# Patient Record
Sex: MALE | Race: WHITE | NOT HISPANIC OR LATINO | Employment: UNEMPLOYED | URBAN - METROPOLITAN AREA
[De-identification: names, ages, dates, MRNs, and addresses within clinical notes are randomized per-mention and may not be internally consistent; named-entity substitution may affect disease eponyms.]

---

## 2019-12-26 ENCOUNTER — EVALUATION (OUTPATIENT)
Dept: PHYSICAL THERAPY | Facility: CLINIC | Age: 49
End: 2019-12-26
Payer: COMMERCIAL

## 2019-12-26 DIAGNOSIS — S83.242A OTHER TEAR OF MEDIAL MENISCUS, CURRENT INJURY, LEFT KNEE, INITIAL ENCOUNTER: Primary | ICD-10-CM

## 2019-12-26 PROCEDURE — 97110 THERAPEUTIC EXERCISES: CPT

## 2019-12-26 PROCEDURE — 97140 MANUAL THERAPY 1/> REGIONS: CPT

## 2019-12-26 PROCEDURE — 97162 PT EVAL MOD COMPLEX 30 MIN: CPT

## 2019-12-26 RX ORDER — LISINOPRIL 20 MG/1
20 TABLET ORAL 2 TIMES DAILY
COMMUNITY

## 2019-12-26 NOTE — LETTER
2019    Rudi Ca MD  [facility] [address] [place] [address]    Patient: Meghan Bryant   YOB: 1970   Date of Visit: 2019     Encounter Diagnosis     ICD-10-CM    1  Other tear of medial meniscus, current injury, left knee, initial encounter [id number]        Dear Dr Maddie Owusu: Thank you for your recent referral of Meghan Bryant  Please review the attached evaluation summary from [facility] recent visit  Please verify that you agree with the plan of care by signing the attached order  If you have any questions or concerns, please do not hesitate to call  I sincerely appreciate the opportunity to share in the care of one of your patients and hope to have another opportunity to work with you in the near future  Sincerely,    Emmanuel Durant, PT      Referring Provider:      I certify that I have read the below Plan of Care and certify the need for these services furnished under this plan of treatment while under my care  Rudi Ca MD  [facility] 31: [phone]          PT Evaluation     Today's date: 2019  Patient name: Meghan Bryant  : 1970  MRN: [id number]  Referring provider: Td Reyes MD  Dx:   Encounter Diagnosis     ICD-10-CM    1  Other tear of medial meniscus, current injury, left knee, initial encounter S83 242A        Start Time: 930  Stop Time: 1030  Total time in clinic (min): 60 minutes    Assessment  Assessment details: Patient is a 52 yo male presenting to physical therapy with symptoms consistent with L meniscal tear that started about one month ago after stepping and twisting  Patient is TTP along the medial joint line, has increased pain with weight bearing and had swelling after initial injury   Patient presents with decreased hip and knee strength 2* pain levels, decreased knee flexion ROM, decreased patellar mobility and gait deviations such as lateral lean and antalgic  Patient has increased pain with walking, standing, lifting, climbing a ladder and driving  The patient's pain is impeding on his daily activities and job related tasks  Patient is receiving a MRI on 12/27/19 on the L knee  PT will address the noted impairments by performing hip and knee strengthening, stretching, balance, functional activities and manual techniques to allow the patient to return to his PLOF  PT recommended 2x/week for 4 weeks c a guarded prognosis 2* pain levels with movement       Impairments: abnormal gait, abnormal or restricted ROM, activity intolerance, impaired balance, impaired physical strength, lacks appropriate home exercise program, pain with function, safety issue and weight-bearing intolerance    Symptom irritability: moderateUnderstanding of Dx/Px/POC: good   Prognosis: fair    Goals  STG: In four weeks the patient will:    1  Be (I) with his HEP  2  Increase hip and knee strength to 4+/5 MMT score to assist c ADLs  3  Increase knee ROM to 135 degrees to assist c stairs and transfers  LTG: In six weeks, the patient will:    1  Increase FOTO score to 57 to demonstrate improvements in symptoms and function  2  Demonstrate full L knee AROM without pain  3  Perform 30 mins of walking without pain  4  Increase hip and knee strength to 5/5 MMT score to assist c prolonged walking and standing  5  Descend a flight of stairs without pain          Plan  Patient would benefit from: skilled physical therapy  Planned modality interventions: cryotherapy and thermotherapy: hydrocollator packs  Planned therapy interventions: abdominal trunk stabilization, joint mobilization, manual therapy, massage, Hancock taping, balance, body mechanics training, neuromuscular re-education, patient education, postural training, strengthening, stretching, therapeutic activities, therapeutic exercise and home exercise program  Frequency: 2x week  Duration in visits: 8  Duration in weeks: 4  Plan of Care beginning date: 2019  Plan of Care expiration date: 2020  Treatment plan discussed with: patient        Subjective Evaluation    History of Present Illness  Mechanism of injury: Patient noted about three years ago he injured his R knee with a meniscal tear and tibial fx  While the R knee was MRI, the L knee also had an MRI  They noted a slight tear of the meniscus, however the patient noted no pain  Patient noted about a months ago he took a step and twisted and noted increased pain in the L knee  Since then he has had increased pain with walking, standing > 20 mins, driving, descending the stairs, sleeping and climbing a ladder  Patient noted he is nervous he might fall due to the instability of the knee  Patient noted no numbness or tingling in the LEs  Patient noted as soon as it happened, the knee was swollen for about a week on the medial side  Patient noted an anti-inflammatory decreased the swelling  Recurrent probem    Quality of life: good    Pain  Current pain ratin  At best pain ratin  At worst pain ratin  Location: L medial knee  Quality: sharp and dull ache  Relieving factors: medications, change in position and ice  Aggravating factors: standing, walking, sitting, stair climbing, running and lifting  Progression: worsening    Social Support  Steps to enter house: yes ([address], back no steps)  Lives in: multiple-level home  Lives with: spouse and young children    Employment status: working ([address])  Hand dominance: left      Diagnostic Tests  Abnormal MRI: tommorrow    Patient Goals  Patient goals for therapy: increased strength, independence with ADLs/IADLs, return to sport/leisure activities, return to work, decreased pain and increased motion  Patient goal: "to decrease pain and get back to work related activiites "        Objective     Tenderness   Left Knee   Tenderness in the MCL (proximal), medial joint line and pes anserinus  Neurological Testing     Sensation     Knee   Left Knee   Intact: light touch    Right Knee   Intact: light touch     Active Range of Motion   Left Knee   Flexion: 120 degrees with pain  Extension: 0 degrees     Right Knee   Normal active range of motion    Mobility   Patellar Mobility:   Left Knee   Hypomobile: left medial, left lateral, left superior and left inferior    Right Knee   Hypomobile: medial, lateral, superior and inferior     Strength/Myotome Testing     Left Hip   Planes of Motion   Flexion: 4  Abduction: 4  Adduction: 4  External rotation: 4  Internal rotation: 4    Right Hip   Planes of Motion   Flexion: 4+  Abduction: 4+  Adduction: 4+  External rotation: 4+  Internal rotation: 4+    Left Knee   Flexion: 4  Extension: 4  Quadriceps contraction: fair    Right Knee   Flexion: 5  Extension: 5  Quadriceps contraction: good    Additional Strength Details  DF: 5/5  PF: 5/5      Swelling     Left Knee Girth Measurement (cm)   Joint line: Patient presented with no swelling at the time of IE, however there was swelling for about a week when it first happened         Flowsheet Rows      Most Recent Value   PT/OT G-Codes   Current Score  32   Projected Score  57   FOTO information reviewed  Yes   Assessment Type  Evaluation   G code set  Mobility: Walking & Moving Around             Precautions: none      Manual  12/26            Patellar mobilizations (inferior/sup & lat/med) MW  Grade  III            STM pes anserine nv                                                       Exercise Diary  12/26            Bike retro nv            Hamstring stretch 3x30"            Hip adductor stretch 3x30"            bridge 2x10  5" hold            ITB stretch 3x30"            clams nv            sidelying hip abd nv            Hip add nv            Leg press nv            Hip extensions nv Modalities  12/26            CP PRN np

## 2019-12-26 NOTE — PROGRESS NOTES
PT Evaluation     Today's date: 2019  Patient name: Robby Cesar  : 1970  MRN: [id number]  Referring provider: Dolly Xavier MD  Dx:   Encounter Diagnosis     ICD-10-CM    1  Other tear of medial meniscus, current injury, left knee, initial encounter S83 242A        Start Time: 930  Stop Time: 1030  Total time in clinic (min): 60 minutes    Assessment  Assessment details: Patient is a 52 yo male presenting to physical therapy with symptoms consistent with L meniscal tear that started about one month ago after stepping and twisting  Patient is TTP along the medial joint line, has increased pain with weight bearing and had swelling after initial injury  Patient presents with decreased hip and knee strength 2* pain levels, decreased knee flexion ROM, decreased patellar mobility and gait deviations such as lateral lean and antalgic  Patient has increased pain with walking, standing, lifting, climbing a ladder and driving  The patient's pain is impeding on his daily activities and job related tasks  Patient is receiving a MRI on 19 on the L knee  PT will address the noted impairments by performing hip and knee strengthening, stretching, balance, functional activities and manual techniques to allow the patient to return to his PLOF  PT recommended 2x/week for 4 weeks c a guarded prognosis 2* pain levels with movement       Impairments: abnormal gait, abnormal or restricted ROM, activity intolerance, impaired balance, impaired physical strength, lacks appropriate home exercise program, pain with function, safety issue and weight-bearing intolerance    Symptom irritability: moderateUnderstanding of Dx/Px/POC: good   Prognosis: fair    Goals  STG: In four weeks the patient will:    1  Be (I) with his HEP  2  Increase hip and knee strength to 4+/5 MMT score to assist c ADLs  3  Increase knee ROM to 135 degrees to assist c stairs and transfers  LTG: In six weeks, the patient will:    1  Increase FOTO score to 57 to demonstrate improvements in symptoms and function  2  Demonstrate full L knee AROM without pain  3  Perform 30 mins of walking without pain  4  Increase hip and knee strength to 5/5 MMT score to assist c prolonged walking and standing  5  Descend a flight of stairs without pain  Plan  Patient would benefit from: skilled physical therapy  Planned modality interventions: cryotherapy and thermotherapy: hydrocollator packs  Planned therapy interventions: abdominal trunk stabilization, joint mobilization, manual therapy, massage, Hancock taping, balance, body mechanics training, neuromuscular re-education, patient education, postural training, strengthening, stretching, therapeutic activities, therapeutic exercise and home exercise program  Frequency: 2x week  Duration in visits: 8  Duration in weeks: 4  Plan of Care beginning date: 2019  Plan of Care expiration date: 2020  Treatment plan discussed with: patient        Subjective Evaluation    History of Present Illness  Mechanism of injury: Patient noted about three years ago he injured his R knee with a meniscal tear and tibial fx  While the R knee was MRI, the L knee also had an MRI  They noted a slight tear of the meniscus, however the patient noted no pain  Patient noted about a months ago he took a step and twisted and noted increased pain in the L knee  Since then he has had increased pain with walking, standing > 20 mins, driving, descending the stairs, sleeping and climbing a ladder  Patient noted he is nervous he might fall due to the instability of the knee  Patient noted no numbness or tingling in the LEs  Patient noted as soon as it happened, the knee was swollen for about a week on the medial side  Patient noted an anti-inflammatory decreased the swelling             Recurrent probem    Quality of life: good    Pain  Current pain ratin  At best pain ratin  At worst pain ratin  Location: L medial knee  Quality: sharp and dull ache  Relieving factors: medications, change in position and ice  Aggravating factors: standing, walking, sitting, stair climbing, running and lifting  Progression: worsening    Social Support  Steps to enter house: yes ([address], back no steps)  Lives in: multiple-level home  Lives with: spouse and young children    Employment status: working ([address])  Hand dominance: left      Diagnostic Tests  Abnormal MRI: tommorrow  Patient Goals  Patient goals for therapy: increased strength, independence with ADLs/IADLs, return to sport/leisure activities, return to work, decreased pain and increased motion  Patient goal: "to decrease pain and get back to work related activiites "        Objective     Tenderness   Left Knee   Tenderness in the MCL (proximal), medial joint line and pes anserinus  Neurological Testing     Sensation     Knee   Left Knee   Intact: light touch    Right Knee   Intact: light touch     Active Range of Motion   Left Knee   Flexion: 120 degrees with pain  Extension: 0 degrees     Right Knee   Normal active range of motion    Mobility   Patellar Mobility:   Left Knee   Hypomobile: left medial, left lateral, left superior and left inferior    Right Knee   Hypomobile: medial, lateral, superior and inferior     Strength/Myotome Testing     Left Hip   Planes of Motion   Flexion: 4  Abduction: 4  Adduction: 4  External rotation: 4  Internal rotation: 4    Right Hip   Planes of Motion   Flexion: 4+  Abduction: 4+  Adduction: 4+  External rotation: 4+  Internal rotation: 4+    Left Knee   Flexion: 4  Extension: 4  Quadriceps contraction: fair    Right Knee   Flexion: 5  Extension: 5  Quadriceps contraction: good    Additional Strength Details  DF: 5/5  PF: 5/5      Swelling     Left Knee Girth Measurement (cm)   Joint line: Patient presented with no swelling at the time of IE, however there was swelling for about a week when it first happened  Flowsheet Rows      Most Recent Value   PT/OT G-Codes   Current Score  32   Projected Score  57   FOTO information reviewed  Yes   Assessment Type  Evaluation   G code set  Mobility: Walking & Moving Around             Precautions: none      Manual  12/26            Patellar mobilizations (inferior/sup & lat/med) MW  Grade  III            STM pes anserine nv                                                       Exercise Diary  12/26            Bike retro nv            Hamstring stretch 3x30"            Hip adductor stretch 3x30"            bridge 2x10  5" hold            ITB stretch 3x30"            clams nv            sidelying hip abd nv            Hip add nv            Leg press nv            Hip extensions nv                                                                                                                                                  Modalities  12/26            CP PRN np

## 2019-12-27 ENCOUNTER — TRANSCRIBE ORDERS (OUTPATIENT)
Dept: PHYSICAL THERAPY | Facility: CLINIC | Age: 49
End: 2019-12-27

## 2019-12-27 DIAGNOSIS — S83.242A ACUTE MEDIAL MENISCUS TEAR OF LEFT KNEE, INITIAL ENCOUNTER: Primary | ICD-10-CM

## 2019-12-30 ENCOUNTER — APPOINTMENT (OUTPATIENT)
Dept: PHYSICAL THERAPY | Facility: CLINIC | Age: 49
End: 2019-12-30
Payer: COMMERCIAL

## 2020-01-02 ENCOUNTER — OFFICE VISIT (OUTPATIENT)
Dept: PHYSICAL THERAPY | Facility: CLINIC | Age: 50
End: 2020-01-02
Payer: COMMERCIAL

## 2020-01-02 DIAGNOSIS — S83.242A OTHER TEAR OF MEDIAL MENISCUS, CURRENT INJURY, LEFT KNEE, INITIAL ENCOUNTER: Primary | ICD-10-CM

## 2020-01-02 PROCEDURE — 97112 NEUROMUSCULAR REEDUCATION: CPT

## 2020-01-02 PROCEDURE — 97110 THERAPEUTIC EXERCISES: CPT

## 2020-01-02 NOTE — PROGRESS NOTES
Daily Note     Today's date: 2020  Patient name: Kathy Mcmillan  : 1970  MRN: [id number]  Referring provider: Sandra Merino MD  Dx:   Encounter Diagnosis     ICD-10-CM    1  Other tear of medial meniscus, current injury, left knee, initial encounter S83 242A        Start Time: 1030  Stop Time: 1130  Total time in clinic (min): 60 minutes    Subjective: Patient noted that he feels good today 2* not walking on it yesterday  Patient noted he received his MRI and is following up with the surgeon next week to discuss next steps  Objective: See treatment diary below      Assessment: Patient performed non-weight bearing exercises to decrease pain while strengthening the hips  Patient performed with discomfort in the knees at times  Patient's decreased quad and hamstring strength plays a large roll his his pain levels when weight bearing  PT applied a CP to the L knee at the end of the session  Patient would benefit from continued PT to allow the patient to return to his PLOF  Plan: Continue per plan of care        Precautions: none      Manual             Patellar mobilizations (inferior/sup & lat/med) MW  Grade  III            STM pes anserine nv                                                       Exercise Diary             Bike retro nv 10'           Hamstring stretch 3x30" 3x30" ea           Hip adductor stretch 3x30" 3x30" ea           bridge 2x10  5" hold            ITB stretch 3x30" 3x30" ea           clams nv 2x10 ea           sidelying hip abd nv 2x10 ea           Hip add nv 2x10  5" hold           Leg press nv            Hip extensions nv 2x10 ea                                                                                                                                                 Modalities             CP PRN np CP  10' post  L knee

## 2020-01-13 ENCOUNTER — APPOINTMENT (OUTPATIENT)
Dept: PHYSICAL THERAPY | Facility: CLINIC | Age: 50
End: 2020-01-13
Payer: COMMERCIAL

## 2020-01-15 ENCOUNTER — APPOINTMENT (OUTPATIENT)
Dept: PHYSICAL THERAPY | Facility: CLINIC | Age: 50
End: 2020-01-15
Payer: COMMERCIAL